# Patient Record
Sex: MALE | URBAN - METROPOLITAN AREA
[De-identification: names, ages, dates, MRNs, and addresses within clinical notes are randomized per-mention and may not be internally consistent; named-entity substitution may affect disease eponyms.]

---

## 2023-08-10 ENCOUNTER — HOSPITAL ENCOUNTER (OUTPATIENT)
Dept: CT IMAGING | Facility: HOSPITAL | Age: 38
Discharge: HOME | End: 2023-08-10
Payer: COMMERCIAL

## 2023-08-10 DIAGNOSIS — H92.01 RIGHT EAR PAIN: ICD-10-CM

## 2023-08-10 DIAGNOSIS — H92.11 OTORRHEA OF RIGHT EAR: ICD-10-CM

## 2023-08-10 DIAGNOSIS — H72.91 TYMPANIC MEMBRANE PERFORATION, RIGHT: ICD-10-CM

## 2023-08-10 PROCEDURE — G1004 CDSM NDSC: HCPCS

## 2023-08-10 PROCEDURE — 70480 CT ORBIT/EAR/FOSSA W/O DYE: CPT

## 2023-08-25 ENCOUNTER — TELEPHONE (OUTPATIENT)
Dept: OTOLARYNGOLOGY | Facility: CLINIC | Age: 38
End: 2023-08-25

## 2023-08-25 NOTE — TELEPHONE ENCOUNTER
----- Message from Valentino Rooney sent at 8/24/2023  2:01 PM EDT -----  Regarding: RE: appt with Dr. Maria De Jesus Bush  March 7, 2024 @9:00 AM.   Directions and appointment card mailed to patient.  ----- Message -----  From: Felix Marshall PA-C  Sent: 8/24/2023  12:17 PM EDT  To: Valentino Rooney  Subject: appt with Dr. Meek Corcoran pt of CT results.    He is awaiting call back to scheduled with Dr. Maria De Jesus Bush.   ----- Message -----  From: Interface, Radiology Results In  Sent: 8/14/2023   2:12 PM EDT  To: Felix Marshall PA-C

## 2024-04-26 ENCOUNTER — APPOINTMENT (OUTPATIENT)
Dept: LAB | Facility: CLINIC | Age: 39
End: 2024-04-26
Payer: COMMERCIAL

## 2024-04-26 DIAGNOSIS — H72.91 PERFORATION OF RIGHT TYMPANIC MEMBRANE: ICD-10-CM

## 2024-04-26 DIAGNOSIS — H69.93 ETD (EUSTACHIAN TUBE DYSFUNCTION), BILATERAL: ICD-10-CM

## 2024-04-26 LAB
ANION GAP SERPL CALCULATED.3IONS-SCNC: 9 MMOL/L (ref 4–13)
BASOPHILS # BLD AUTO: 0.06 THOUSANDS/ÂΜL (ref 0–0.1)
BASOPHILS NFR BLD AUTO: 1 % (ref 0–1)
BUN SERPL-MCNC: 12 MG/DL (ref 5–25)
CALCIUM SERPL-MCNC: 9.6 MG/DL (ref 8.4–10.2)
CHLORIDE SERPL-SCNC: 104 MMOL/L (ref 96–108)
CO2 SERPL-SCNC: 24 MMOL/L (ref 21–32)
CREAT SERPL-MCNC: 1.08 MG/DL (ref 0.6–1.3)
EOSINOPHIL # BLD AUTO: 0.02 THOUSAND/ÂΜL (ref 0–0.61)
EOSINOPHIL NFR BLD AUTO: 0 % (ref 0–6)
ERYTHROCYTE [DISTWIDTH] IN BLOOD BY AUTOMATED COUNT: 12.1 % (ref 11.6–15.1)
GFR SERPL CREATININE-BSD FRML MDRD: 87 ML/MIN/1.73SQ M
GLUCOSE SERPL-MCNC: 103 MG/DL (ref 65–140)
HCT VFR BLD AUTO: 46.9 % (ref 36.5–49.3)
HGB BLD-MCNC: 15.5 G/DL (ref 12–17)
IMM GRANULOCYTES # BLD AUTO: 0.04 THOUSAND/UL (ref 0–0.2)
IMM GRANULOCYTES NFR BLD AUTO: 0 % (ref 0–2)
LYMPHOCYTES # BLD AUTO: 2.62 THOUSANDS/ÂΜL (ref 0.6–4.47)
LYMPHOCYTES NFR BLD AUTO: 25 % (ref 14–44)
MCH RBC QN AUTO: 30.6 PG (ref 26.8–34.3)
MCHC RBC AUTO-ENTMCNC: 33 G/DL (ref 31.4–37.4)
MCV RBC AUTO: 93 FL (ref 82–98)
MONOCYTES # BLD AUTO: 0.34 THOUSAND/ÂΜL (ref 0.17–1.22)
MONOCYTES NFR BLD AUTO: 3 % (ref 4–12)
NEUTROPHILS # BLD AUTO: 7.46 THOUSANDS/ÂΜL (ref 1.85–7.62)
NEUTS SEG NFR BLD AUTO: 71 % (ref 43–75)
NRBC BLD AUTO-RTO: 0 /100 WBCS
PLATELET # BLD AUTO: 367 THOUSANDS/UL (ref 149–390)
PMV BLD AUTO: 9.6 FL (ref 8.9–12.7)
POTASSIUM SERPL-SCNC: 4.1 MMOL/L (ref 3.5–5.3)
RBC # BLD AUTO: 5.07 MILLION/UL (ref 3.88–5.62)
SODIUM SERPL-SCNC: 137 MMOL/L (ref 135–147)
WBC # BLD AUTO: 10.54 THOUSAND/UL (ref 4.31–10.16)

## 2024-04-26 PROCEDURE — 36415 COLL VENOUS BLD VENIPUNCTURE: CPT

## 2024-04-26 PROCEDURE — 80048 BASIC METABOLIC PNL TOTAL CA: CPT

## 2024-04-26 PROCEDURE — 85025 COMPLETE CBC W/AUTO DIFF WBC: CPT

## 2024-05-06 RX ORDER — DIPHENOXYLATE HYDROCHLORIDE AND ATROPINE SULFATE 2.5; .025 MG/1; MG/1
1 TABLET ORAL DAILY
COMMUNITY
End: 2024-05-06

## 2024-05-09 ENCOUNTER — ANESTHESIA (OUTPATIENT)
Dept: PERIOP | Facility: HOSPITAL | Age: 39
End: 2024-05-09
Payer: COMMERCIAL

## 2024-05-09 ENCOUNTER — ANESTHESIA EVENT (OUTPATIENT)
Dept: PERIOP | Facility: HOSPITAL | Age: 39
End: 2024-05-09
Payer: COMMERCIAL

## 2024-05-09 ENCOUNTER — HOSPITAL ENCOUNTER (OUTPATIENT)
Facility: HOSPITAL | Age: 39
Setting detail: OUTPATIENT SURGERY
Discharge: HOME | End: 2024-05-09
Attending: OTOLARYNGOLOGY | Admitting: OTOLARYNGOLOGY
Payer: COMMERCIAL

## 2024-05-09 VITALS
RESPIRATION RATE: 18 BRPM | HEART RATE: 70 BPM | BODY MASS INDEX: 25.49 KG/M2 | SYSTOLIC BLOOD PRESSURE: 137 MMHG | TEMPERATURE: 97.9 F | HEIGHT: 75 IN | WEIGHT: 205 LBS | OXYGEN SATURATION: 98 % | DIASTOLIC BLOOD PRESSURE: 66 MMHG

## 2024-05-09 DIAGNOSIS — Z98.890 PONV (POSTOPERATIVE NAUSEA AND VOMITING): Primary | ICD-10-CM

## 2024-05-09 DIAGNOSIS — R11.2 PONV (POSTOPERATIVE NAUSEA AND VOMITING): Primary | ICD-10-CM

## 2024-05-09 PROCEDURE — 69210 REMOVE IMPACTED EAR WAX UNI: CPT | Performed by: OTOLARYNGOLOGY

## 2024-05-09 PROCEDURE — 69631 REPAIR EARDRUM STRUCTURES: CPT | Performed by: OTOLARYNGOLOGY

## 2024-05-09 RX ORDER — MIDAZOLAM HYDROCHLORIDE 2 MG/2ML
INJECTION, SOLUTION INTRAMUSCULAR; INTRAVENOUS AS NEEDED
Status: DISCONTINUED | OUTPATIENT
Start: 2024-05-09 | End: 2024-05-09

## 2024-05-09 RX ORDER — PROPOFOL 10 MG/ML
INJECTION, EMULSION INTRAVENOUS CONTINUOUS PRN
Status: DISCONTINUED | OUTPATIENT
Start: 2024-05-09 | End: 2024-05-09

## 2024-05-09 RX ORDER — SUCCINYLCHOLINE/SOD CL,ISO/PF 100 MG/5ML
SYRINGE (ML) INTRAVENOUS AS NEEDED
Status: DISCONTINUED | OUTPATIENT
Start: 2024-05-09 | End: 2024-05-09

## 2024-05-09 RX ORDER — FENTANYL CITRATE/PF 50 MCG/ML
25 SYRINGE (ML) INJECTION
Status: DISCONTINUED | OUTPATIENT
Start: 2024-05-09 | End: 2024-05-09 | Stop reason: HOSPADM

## 2024-05-09 RX ORDER — FENTANYL CITRATE 50 UG/ML
INJECTION, SOLUTION INTRAMUSCULAR; INTRAVENOUS AS NEEDED
Status: DISCONTINUED | OUTPATIENT
Start: 2024-05-09 | End: 2024-05-09

## 2024-05-09 RX ORDER — OXYMETAZOLINE HYDROCHLORIDE 0.05 G/100ML
SPRAY NASAL AS NEEDED
Status: DISCONTINUED | OUTPATIENT
Start: 2024-05-09 | End: 2024-05-09 | Stop reason: HOSPADM

## 2024-05-09 RX ORDER — ONDANSETRON 2 MG/ML
INJECTION INTRAMUSCULAR; INTRAVENOUS AS NEEDED
Status: DISCONTINUED | OUTPATIENT
Start: 2024-05-09 | End: 2024-05-09

## 2024-05-09 RX ORDER — IBUPROFEN 200 MG
200 TABLET ORAL EVERY 6 HOURS PRN
Status: DISCONTINUED | OUTPATIENT
Start: 2024-05-09 | End: 2024-05-09 | Stop reason: HOSPADM

## 2024-05-09 RX ORDER — HYDROMORPHONE HCL/PF 1 MG/ML
0.5 SYRINGE (ML) INJECTION
Status: DISCONTINUED | OUTPATIENT
Start: 2024-05-09 | End: 2024-05-09 | Stop reason: HOSPADM

## 2024-05-09 RX ORDER — CEFAZOLIN SODIUM 2 G/50ML
2000 SOLUTION INTRAVENOUS ONCE
Status: COMPLETED | OUTPATIENT
Start: 2024-05-09 | End: 2024-05-09

## 2024-05-09 RX ORDER — ONDANSETRON 2 MG/ML
4 INJECTION INTRAMUSCULAR; INTRAVENOUS EVERY 6 HOURS PRN
Status: DISCONTINUED | OUTPATIENT
Start: 2024-05-09 | End: 2024-05-09 | Stop reason: HOSPADM

## 2024-05-09 RX ORDER — DEXAMETHASONE SODIUM PHOSPHATE 10 MG/ML
INJECTION, SOLUTION INTRAMUSCULAR; INTRAVENOUS AS NEEDED
Status: DISCONTINUED | OUTPATIENT
Start: 2024-05-09 | End: 2024-05-09

## 2024-05-09 RX ORDER — SODIUM CHLORIDE, SODIUM LACTATE, POTASSIUM CHLORIDE, CALCIUM CHLORIDE 600; 310; 30; 20 MG/100ML; MG/100ML; MG/100ML; MG/100ML
INJECTION, SOLUTION INTRAVENOUS CONTINUOUS PRN
Status: DISCONTINUED | OUTPATIENT
Start: 2024-05-09 | End: 2024-05-09

## 2024-05-09 RX ORDER — ONDANSETRON 4 MG/1
4 TABLET, FILM COATED ORAL EVERY 8 HOURS PRN
Qty: 20 TABLET | Refills: 0 | Status: SHIPPED | OUTPATIENT
Start: 2024-05-09

## 2024-05-09 RX ORDER — ONDANSETRON 2 MG/ML
4 INJECTION INTRAMUSCULAR; INTRAVENOUS ONCE AS NEEDED
Status: DISCONTINUED | OUTPATIENT
Start: 2024-05-09 | End: 2024-05-09 | Stop reason: HOSPADM

## 2024-05-09 RX ORDER — LIDOCAINE HYDROCHLORIDE 10 MG/ML
INJECTION, SOLUTION EPIDURAL; INFILTRATION; INTRACAUDAL; PERINEURAL AS NEEDED
Status: DISCONTINUED | OUTPATIENT
Start: 2024-05-09 | End: 2024-05-09

## 2024-05-09 RX ORDER — EPINEPHRINE 1 MG/ML
INJECTION, SOLUTION, CONCENTRATE INTRAVENOUS AS NEEDED
Status: DISCONTINUED | OUTPATIENT
Start: 2024-05-09 | End: 2024-05-09 | Stop reason: HOSPADM

## 2024-05-09 RX ORDER — PROPOFOL 10 MG/ML
INJECTION, EMULSION INTRAVENOUS AS NEEDED
Status: DISCONTINUED | OUTPATIENT
Start: 2024-05-09 | End: 2024-05-09

## 2024-05-09 RX ORDER — OFLOXACIN 3 MG/ML
SOLUTION/ DROPS OPHTHALMIC AS NEEDED
Status: DISCONTINUED | OUTPATIENT
Start: 2024-05-09 | End: 2024-05-09 | Stop reason: HOSPADM

## 2024-05-09 RX ORDER — ACETAMINOPHEN 325 MG/1
650 TABLET ORAL EVERY 6 HOURS PRN
Status: DISCONTINUED | OUTPATIENT
Start: 2024-05-09 | End: 2024-05-09 | Stop reason: HOSPADM

## 2024-05-09 RX ADMIN — SODIUM CHLORIDE 0.25 MCG/KG/MIN: 900 INJECTION INTRAVENOUS at 08:15

## 2024-05-09 RX ADMIN — FENTANYL CITRATE 100 MCG: 50 INJECTION INTRAMUSCULAR; INTRAVENOUS at 08:07

## 2024-05-09 RX ADMIN — IBUPROFEN 200 MG: 200 TABLET, FILM COATED ORAL at 13:23

## 2024-05-09 RX ADMIN — PROPOFOL 250 MG: 10 INJECTION, EMULSION INTRAVENOUS at 08:07

## 2024-05-09 RX ADMIN — DEXAMETHASONE SODIUM PHOSPHATE 10 MG: 10 INJECTION, SOLUTION INTRAMUSCULAR; INTRAVENOUS at 08:07

## 2024-05-09 RX ADMIN — SODIUM CHLORIDE, SODIUM LACTATE, POTASSIUM CHLORIDE, AND CALCIUM CHLORIDE: .6; .31; .03; .02 INJECTION, SOLUTION INTRAVENOUS at 07:59

## 2024-05-09 RX ADMIN — LIDOCAINE HYDROCHLORIDE 50 MG: 10 INJECTION, SOLUTION EPIDURAL; INFILTRATION; INTRACAUDAL at 08:07

## 2024-05-09 RX ADMIN — PHENYLEPHRINE HYDROCHLORIDE 20 MCG/MIN: 10 INJECTION INTRAVENOUS at 08:19

## 2024-05-09 RX ADMIN — Medication 200 MG: at 08:07

## 2024-05-09 RX ADMIN — FENTANYL CITRATE 25 MCG: 50 INJECTION INTRAMUSCULAR; INTRAVENOUS at 11:50

## 2024-05-09 RX ADMIN — CEFAZOLIN SODIUM 2000 MG: 2 SOLUTION INTRAVENOUS at 08:22

## 2024-05-09 RX ADMIN — PROPOFOL 170 MCG/KG/MIN: 10 INJECTION, EMULSION INTRAVENOUS at 08:15

## 2024-05-09 RX ADMIN — ONDANSETRON 4 MG: 2 INJECTION INTRAMUSCULAR; INTRAVENOUS at 10:43

## 2024-05-09 RX ADMIN — MIDAZOLAM 2 MG: 1 INJECTION INTRAMUSCULAR; INTRAVENOUS at 08:01

## 2024-05-09 NOTE — OP NOTE
OPERATIVE REPORT  PATIENT NAME: Jay Jay Mata    :  1985  MRN: [id number]  Pt Location:  OR ROOM 03    SURGERY DATE: 2024    Surgeons and Role:     * Lyssa Nolan MD - Primary     * Gold Pierce MD    Preop Diagnosis:  Perforation of right tympanic membrane [H72.91]  ETD (Eustachian tube dysfunction), bilateral [H69.93]    Post-Op Diagnosis Codes:     * Perforation of right tympanic membrane [H72.91]     * ETD (Eustachian tube dysfunction), bilateral [H69.93]    Procedure(s):  Right - RIGHT TYMPANOPLASTY WITH CANALPLASTY AND TEMPORALIS FASCIA GRAFT WITH FACIAL NERVE MONTORING.   LEFT EXAMINATION UNDER ANESTHESIA OF EAR AND DEBRIDEMENT OF CERUMEN    Specimen(s):  * No specimens in log * none    Estimated Blood Loss:   Minimal    Drains:  * No LDAs found * none    Anesthesia Type:   General    Operative Indications:  Perforation of right tympanic membrane [H72.91]  ETD (Eustachian tube dysfunction), bilateral [H69.93]    Operative Findings:  50% inferior perforation of the right tympanic membrane  Right ear canal stenosis  Intact and mobile right ossicular chain  Ingrowth of edges of perforation medially in the right middle ear  Left ear canal stenotic with cerumen impaction  Left thickened tympanic membrane, intact    Complications:   None    Procedure and Technique:    The patient was brought into the operating room and placed supine on the OR table. Following the induction of general anesthesia, an endotracheal tube was placed by the anesthesia staff. The bed was turned 180 degrees. A cotton ball was placed in the right ear. A 5 cm incision was marked out 0.5 cm behind the right ear and injected with 1% lidocaine, 1:100,000 epinephrine. A facial nerve monitor was placed on the right face was found to be functional. The patient was then prepped and draped in sterile fashion.    The marked postauricular incision was made through the skin, and the skin flap was elevated anteriorly. A piece of  temporalis fascia was harvested and saved on the back table for later use. The operating microscope was steriley draped, brought into the field, and used for the remainder of the procedure.     Next, the external auditory canal was inspected using the operating microscope. The following findings were noted: the ear canal was stenotic. There was a dry inferior tympanic membrane perforation. The inferior and anterior edges of the perforation could not be visualized. The ear canal was injected in 4 quadrants with a 1:100,000 dilution of epinephrine in sterile injectable saline. From posteriorly, the skin surrounding the external auditory canal was elevated superiorly and inferiorly. Next, the skin of the external auditory canal was elevated medially. Incisions for a Cornelio's flap were made, and the skin was elevated.    A canalplasty was performed at this time. The skin overlying the overhanging area of the anterior bony canal wall was elevated, and the skin flap was protected using a drill shield. Using the high speed otologic drill and microdissection techniques, the bone of the overhanging portion of the canal was removed. Following removal of this bone, the edges of the perforation could be visualized.    The tympanoplasty was then performed as follows. The skin edges of the perforation were removed with a Joyce needle and cup forceps. The tympanomeatal flap was elevated using a duckbill elevator. There was ingrowth of skin from the posterior edge of the perforation onto the pyramidal process and tympanic recess. These skin edges were dissected free. The chorda tympani was intact. The ossicles were intact.. The tympanic segment of the facial nerve was bony covered.     The tympanoplasty was then completed. Floxin-soaked gelfoam was placed into the middle ear cavity. The temporalis fascia was placed in an underlay fashion. The tympanomeatal flap and temporalis fascia graft were then put back into position.  Floxin-soaked gelfoam was then placed over the tympanomeatal flap and temporalis fascia graft.     The Cornelio's flap was then put back into position. The Palva flap was sutured back into position with interrupted sutures of 3-0 vicryl. The ear canal was filled with floxin-soaked gelfoam. The postauricular incision was closed in layers. Tissue glue adhesive was then place over the incision line. The sterile drapes were removed from the head.     The left ear was then examined with the operating microscope. The ear canal was stenotic and filled with cerumen. The cerumen was debrided. The tympanic membrane was thickened but intact. There was no ballotable fluid in the middle ear.    He was then awoken from general anesthesia and taken to the PACU in stable condition. His postoperative facial function was I/VI on the House-Brackmann scale bilaterally.     The patient tolerated this procedure well without complications.     Dr. Nolan was present throughout this procedure and performed all key portions    I was present for the entire procedure.    Patient Disposition:  PACU  and extubated and stable        SIGNATURE: Lyssa Nolna MD  DATE: May 9, 2024  TIME: 11:04 AM

## 2024-05-09 NOTE — ANESTHESIA PREPROCEDURE EVALUATION
Procedure:  RIGHT TYMPANOPLASTY WITH TEMPORALIS FASCIA OR CARTILAGE GRAFT WITH FACIAL NERVE MONTORING, POSSIBLE CANALPLASTY POSSIBLE BILATERAL MYRINGOTOMY AND TUBES, POSSIBLE OSSICULOPLASTY (Right: Ear)  MYRINGOTOMY W/ INSERTION VENTILATION TUBE EAR (Bilateral: Ear)  OSSICULOPLASTY (Right: Ear)    Relevant Problems   No relevant active problems        Physical Exam    Airway    Mallampati score: II         Dental   No notable dental hx     Cardiovascular      Pulmonary      Other Findings        Anesthesia Plan  ASA Score- 1     Anesthesia Type- general with ASA Monitors.         Additional Monitors:     Airway Plan: ETT.    Comment: I, Dr. Morley, the attending physician, have personally seen and evaluated the patient prior to anesthetic care.  I have reviewed the pre-anesthetic record, and other medical records if appropriate to the anesthetic care.  If a CRNA is involved in the case, I have reviewed the CRNA assessment, if present, and agree.  The patient is in a suitable condition to proceed with my formulated anesthetic plan.  .       Plan Factors-    Chart reviewed.                      Induction- intravenous.    Postoperative Plan-     Informed Consent- Anesthetic plan and risks discussed with patient.  I personally reviewed this patient with the CRNA. Discussed and agreed on the Anesthesia Plan with the CRNA..

## 2024-05-09 NOTE — ANESTHESIA POSTPROCEDURE EVALUATION
Post-Op Assessment Note    CV Status:  Stable    Pain management: adequate       Mental Status:  Alert and awake   Hydration Status:  Euvolemic   PONV Controlled:  Controlled   Airway Patency:  Patent     Post Op Vitals Reviewed: Yes    No anethesia notable event occurred.    Staff: Anesthesiologist, CRNA               BP   121/65   Temp   97.8   Pulse  75   Resp   14   SpO2   97

## 2024-05-09 NOTE — H&P
Surgery Pre-op note/Updated History and Physical    Date of service: 5/9/20247:21 AM      No changes from most recent clinic H&P note. Patient to OR for right tympanoplasty, possible ossiculoplasty, possible myringotomy with tube.      Pmh: Reviewed  Pshx: Reviewed  Social history: Reviewed  Medications: Reviewed  ROS: as above      Vitals:    05/09/24 0716   BP:    Pulse:    Resp: 18   Temp:    SpO2:        ENT: oral cavity patent  Chest/Heart/Lungs: Breathing, perfused, unremarkable  Abd: Unremarkable  Ext: Unremarkable        The procedure was discussed with the patient, including risks, benefits, and alternatives and all questions were answered. Consent signed and in the chart.    Gold Pierce MD PGY-3  [facility] Otolaryngology - Head and Neck Surgery  Available on [employer] Text.  Please contact ENT Resident [place] Text Role for any questions or concerns.    5/9/2024 7:21 AM

## 2024-05-09 NOTE — DISCHARGE INSTR - AVS FIRST PAGE
Discharge to home    Remove dressing tomorrow morning  Keep ear dry  OK to wash hair in 3 days, keep ear dry  No lifting > 35 lb for 3 weeks  Continue home medications  Tylenol 650 mg PO Q6 hours as needed for pain  Ibuprofen 200 mg PO q4 hour as needed for pain  Zofran 4 mg PO q6 hours as needed for nausea  Diet as tolerated, ok to eat regular foods  Follow up in 1 week with Dr. Nolan

## (undated) DEVICE — ADHESIVE SKIN HIGH VISCOSITY EXOFIN 1ML

## (undated) DEVICE — ELECTRODE 8227410 PAIRED 2 CH SET ROHS

## (undated) DEVICE — PROXIMATE PLUS MD MULTI-DIRECTIONAL RELEASE SKIN STAPLERS CONTAINS 35 STAINLESS STEEL STAPLES APPROXIMATE CLOSED DIMENSIONS: 6.9MM X 3.9MM WIDE: Brand: PROXIMATE

## (undated) DEVICE — SUT CHROMIC 3-0 SH 27 IN G122H

## (undated) DEVICE — STERILE CUP W/ LID, 2 OZ 2 PK       067: Brand: CARDINAL HEALTH

## (undated) DEVICE — GLOVE SRG BIOGEL 6.5

## (undated) DEVICE — PREMIUM DRY TRAY LF: Brand: MEDLINE INDUSTRIES, INC.

## (undated) DEVICE — TUBING SUCTION 5MM X 12 FT

## (undated) DEVICE — BIPOLAR CORD DISP

## (undated) DEVICE — COTTON BALLS: Brand: DEROYAL

## (undated) DEVICE — SUT PLAIN 5-0 PC-1 18 IN 1915G

## (undated) DEVICE — STERILE EAR PACK: Brand: CARDINAL HEALTH

## (undated) DEVICE — DRAPE CRANI

## (undated) DEVICE — SYRINGE 10ML LL

## (undated) DEVICE — IV CATH INTROCAN 18G X 1 1/4 SAFETY

## (undated) DEVICE — BLADE MYRINGOTOMY 377121

## (undated) DEVICE — TOOL MR8-7BA30D MR8 7CM BALL D 3MM: Brand: MIDAS REX MR8

## (undated) DEVICE — TUBING IRD800 MR8 IRRIGTION ON-DRILL 5PK: Brand: MIDAS REX MR8

## (undated) DEVICE — GAUZE SPONGES,16 PLY: Brand: CURITY

## (undated) DEVICE — MAYO STAND COVER: Brand: CONVERTORS

## (undated) DEVICE — SKIN MARKER DUAL TIP WITH RULER CAP, FLEXIBLE RULER AND LABELS: Brand: DEVON

## (undated) DEVICE — DRAPE SURGIKIT SADDLE BAG

## (undated) DEVICE — SURGIFOAM 8.5 X 12.5

## (undated) DEVICE — DRESSING EAR GLASSCOCK ADULT LRG

## (undated) DEVICE — STRL PENROSE DRAIN 18" X 1/4": Brand: CARDINAL HEALTH

## (undated) DEVICE — TOOL MR8-7BA20DL MR8 7CM BALL D L 2MM: Brand: MIDAS REX MR8

## (undated) DEVICE — PROXIMATE SKIN STAPLERS (35 WIDE) CONTAINS 35 STAINLESS STEEL STAPLES (FIXED HEAD): Brand: PROXIMATE